# Patient Record
Sex: FEMALE | Race: WHITE | NOT HISPANIC OR LATINO | URBAN - NONMETROPOLITAN AREA
[De-identification: names, ages, dates, MRNs, and addresses within clinical notes are randomized per-mention and may not be internally consistent; named-entity substitution may affect disease eponyms.]

---

## 2024-09-27 ENCOUNTER — HOSPITAL ENCOUNTER (OUTPATIENT)
Facility: HOSPITAL | Age: 44
Setting detail: OUTPATIENT SURGERY
End: 2024-09-27
Attending: PODIATRIST | Admitting: PODIATRIST
Payer: MEDICAID

## 2024-09-27 DIAGNOSIS — D21.22 BENIGN NEOPLASM OF CONNECTIVE AND SOFT TISSUE OF LEG, INCLUDING HIP, LEFT: Primary | ICD-10-CM

## 2024-09-27 DIAGNOSIS — M72.2 PLANTAR FASCIAL FIBROMATOSIS: ICD-10-CM

## 2024-10-29 ENCOUNTER — ANESTHESIA EVENT (OUTPATIENT)
Dept: OPERATING ROOM | Facility: HOSPITAL | Age: 44
End: 2024-10-29

## 2024-10-29 DIAGNOSIS — Z01.818 ENCOUNTER FOR PREADMISSION TESTING: Primary | ICD-10-CM

## 2024-10-29 NOTE — ANESTHESIA PREPROCEDURE EVALUATION
Patient: Linnea Astudillo    Procedure Information       Date/Time: 11/12/24 0815    Procedures:       EXCISION, NEOPLASM, SOFT TISSUE, SUBCUTANEOUS (Left)      Fasciectomy Foot (Left)    Location: [facility] / [facility]    Surgeons: Milad Little DPM          Per records she does not have an active PCP.  Pre op nurses have not been able to get ahold of her.  Will continue to try.   Relevant Problems   No relevant active problems       Clinical information reviewed:                   NPO Detail:  No data recorded     PHYSICAL EXAM    Anesthesia Plan

## 2024-11-12 ENCOUNTER — ANESTHESIA (OUTPATIENT)
Dept: OPERATING ROOM | Facility: HOSPITAL | Age: 44
End: 2024-11-12